# Patient Record
Sex: MALE | Race: AMERICAN INDIAN OR ALASKA NATIVE
[De-identification: names, ages, dates, MRNs, and addresses within clinical notes are randomized per-mention and may not be internally consistent; named-entity substitution may affect disease eponyms.]

---

## 2019-06-08 ENCOUNTER — HOSPITAL ENCOUNTER (EMERGENCY)
Dept: HOSPITAL 56 - MW.ED | Age: 39
Discharge: HOME | End: 2019-06-08
Payer: SELF-PAY

## 2019-06-08 DIAGNOSIS — I50.9: Primary | ICD-10-CM

## 2019-06-08 DIAGNOSIS — F17.210: ICD-10-CM

## 2019-06-08 DIAGNOSIS — Z79.899: ICD-10-CM

## 2019-06-08 LAB
CHLORIDE SERPL-SCNC: 107 MMOL/L (ref 98–107)
SODIUM SERPL-SCNC: 139 MMOL/L (ref 136–148)

## 2019-06-08 PROCEDURE — 71045 X-RAY EXAM CHEST 1 VIEW: CPT

## 2019-06-08 PROCEDURE — 83690 ASSAY OF LIPASE: CPT

## 2019-06-08 PROCEDURE — 85025 COMPLETE CBC W/AUTO DIFF WBC: CPT

## 2019-06-08 PROCEDURE — 99285 EMERGENCY DEPT VISIT HI MDM: CPT

## 2019-06-08 PROCEDURE — 85610 PROTHROMBIN TIME: CPT

## 2019-06-08 PROCEDURE — 93005 ELECTROCARDIOGRAM TRACING: CPT

## 2019-06-08 PROCEDURE — 83880 ASSAY OF NATRIURETIC PEPTIDE: CPT

## 2019-06-08 PROCEDURE — 80053 COMPREHEN METABOLIC PANEL: CPT

## 2019-06-08 PROCEDURE — 84484 ASSAY OF TROPONIN QUANT: CPT

## 2019-06-08 NOTE — CR
INDICATION:



Chest pain



TECHNIQUE:



Chest 1 view



COMPARISON:



None



FINDINGS:



Cardiovascular and mediastinum:  Mild cardiomegaly with normal pulmonary 

vasculature. 



Lungs and pleural spaces:  Lungs are clear.  No sign of infiltrate or mass. 

 No sign of pleural effusion.  No pneumothorax.  



Bones and soft tissues:  No significant findings.



IMPRESSION:



Mild cardiomegaly without acute pulmonary consolidation.



Dictated by Jonh Spangler MD @ Jun 8 2019  6:50AM



Signed by Dr. Jonh Spangler @ Jun 8 2019  6:51AM

## 2019-06-08 NOTE — EDM.PDOC
<Clarita Collins - Last Filed: 06/08/19 07:04>





ED HPI GENERAL MEDICAL PROBLEM





- General


Chief Complaint: Respiratory Problem


Stated Complaint: SHORTNESS OF BREATH


Time Seen by Provider: 06/08/19 06:18





- History of Present Illness


INITIAL COMMENTS - FREE TEXT/NARRATIVE: 





HISTORY AND PHYSICAL:





History of present illness:


The patient is a 39-year-old male who presents with several weeks of an on 

again off again cough sometimes spastic and sometimes productive of phlegm and 

new shortness of breath over the last 1 week. He says that he does not have 

chest pain specifically but sometimes he feels like the cough and the shortness 

of breath or choking him and there is a pressure-like sensation with the 

shortness of breath. He's not had fevers chills and only recently has had some 

epigastric discomfort without vomiting or diarrhea. The patient does not have a 

local provider but says that in 2016 he had fluid in his lungs and was treated 

with 2 different antihypertensives and to diuretics and saw Dr. Summers in [place] 

as well as Dr. Jara the nephrologist. He says that at this time he had 

kidney trouble and had a follow with the nephrologist and he did follow-up with 

Dr. Summers and he started having more hypotension and he was taken off of his 

blood pressure medications as well as his diuretics and he says he has not been 

very compliant with follow-ups since that time. When I asked him if he had an 

echocardiogram he says yes but he does not know the results and he is not sure 

why he had the heart failure and then subsequently was taken off all 

medications. On this ED visit he says he has not had any leg pain or swelling 

he has been eating and drinking normally and he has not had any fevers or 

chills. The patient says he does sleep on 2 pillows but sometimes has trouble 

and has to sit upright to sleep. The patient says that when he is at work and 

doing activities he feels more short of breath and this is new over the last 1 

week as well. He came in this morning versus any other day because he felt like 

his symptoms would worsen at work today.





Review of systems: 


As per history of present illness and below otherwise all systems reviewed and 

negative.





Past medical history: 


As per history of present illness and as reviewed below otherwise 

noncontributory.





Surgical history: 


As per history of present illness and as reviewed below otherwise 

noncontributory.





Social history: 


No reported history of drug or alcohol abuse.





Family history: 


As per history of present illness and as reviewed below otherwise 

noncontributory.





Physical exam:


General: Well-developed well-nourished man who is nontoxic and not breathless 

on my evaluation but seems a little anxious. He is tachycardic and other vitals 

are noted by me as well


HEENT: Atraumatic, normocephalic, pupils reactive, negative for conjunctival 

pallor or scleral icterus, mucous membranes moist, throat clear, neck supple, 

nontender, trachea midline.


Lungs: Clear to auscultation diminished breath sounds in the bases more on the 

left side but no stridor or wheezing and no abdominal work of breathing, breath 

sounds equal bilaterally, chest nontender.


Heart: S1S2, regular rhythm and tachycardic rate of my evaluation but no overt 

murmurs. There is no evidence of JVD on my evaluation and when I laid the 

patient in the supine position he was not dyspneic


Abdomen: Soft, nondistended, nontender. Negative for masses or 

hepatosplenomegaly. Negative for costovertebral tenderness.


Pelvis: Stable nontender.


Genitourinary: Deferred.


Rectal: Deferred.


Extremities: Atraumatic, negative for cords or calf pain. Neurovascular 

unremarkable. No pedal edema or leg asymmetry


Neuro: Awake, alert, oriented. Cranial nerves II through XII unremarkable. 

Cerebellum unremarkable. Motor and sensory unremarkable throughout. Exam 

nonfocal.





Diagnostics:


EKG CBC CMP INR BNP troponin lipase UA chest x-ray





Therapeutics:


IV O2 monitor aspirin Nitropaste





7a: Case was endorsed to Dr. Duckworth to follow-up testing results and 

disposition patient. Patient is currently stable here in the ED.





Impression: 


Dyspnea acute on chronic





Definitive disposition and diagnosis as appropriate pending reevaluation and 

review of above.


  ** epigastric


Pain Score (Numeric/FACES): 9





- Related Data


 Allergies











Allergy/AdvReac Type Severity Reaction Status Date / Time


 


No Known Allergies Allergy   Verified 06/08/19 06:16











Home Meds: 


 Home Meds





Furosemide [Lasix] 20 mg PO BID #20 tab 06/08/19 [Rx]


Potassium Chloride [Klor-Con 10] 10 meq PO DAILY #20 tab.er 06/08/19 [Rx]











Social & Family History





- Tobacco Use


Smoking Status *Q: Current Every Day Smoker


Years of Tobacco use: 19


Packs/Tins Daily: 1





- Recreational Drug Use


Recreational Drug Use: Yes


Drug Use in Last 12 Months: Yes


Recreational Drug Type: Reports: Marijuana/Hashish


Recreational Drug Use Frequency: Socially





ED ROS GENERAL





- Review of Systems


Review Of Systems: ROS reveals no pertinent complaints other than HPI.





ED EXAM, GENERAL





- Physical Exam


Exam: See Below (See dictation)





Course





- Vital Signs


Last Recorded V/S: 


 Last Vital Signs











Temp  96.7 F   06/08/19 06:07


 


Pulse  103 H  06/08/19 07:06


 


Resp  20   06/08/19 07:06


 


BP  110/81   06/08/19 07:06


 


Pulse Ox  100   06/08/19 07:06














- Orders/Labs/Meds


Orders: 


 Active Orders 24 hr











 Category Date Time Status


 


 Cardiac Monitoring [RC] .AS DIRECTED Care  06/08/19 06:09 Active


 


 EKG Documentation Completion [RC] STAT Care  06/08/19 06:09 Active


 


 Oxygen Therapy, ED [RC] ASDIRECTED Care  06/08/19 06:09 Active


 


 Pulse Oximetry [RC] ASDIRECTED Care  06/08/19 06:09 Active


 


 UA RFX QUINN AND CULT IF INDIC [URIN] Stat Lab  06/08/19 06:23 Ordered


 


 Sodium Chloride 0.9% [Saline Flush] Med  06/08/19 06:23 Active





 10 ml FLUSH ASDIRECTED PRN   


 


 Sodium Chloride 0.9% [Saline Flush] Med  06/08/19 06:23 Active





 2.5 ml FLUSH ASDIRECTED PRN   


 


 Saline Lock Insert [OM.PC] Stat Oth  06/08/19 06:22 Ordered








 Medication Orders





Sodium Chloride (Saline Flush)  10 ml FLUSH ASDIRECTED PRN


   PRN Reason: Keep Vein Open


Sodium Chloride (Saline Flush)  2.5 ml FLUSH ASDIRECTED PRN


   PRN Reason: Keep Vein Open








Labs: 


 Laboratory Tests











  06/08/19 06/08/19 06/08/19 Range/Units





  06:20 06:20 06:20 


 


WBC  11.28 H    (4.0-11.0)  K/uL


 


RBC  4.53    (4.50-5.90)  M/uL


 


Hgb  13.5    (13.0-17.0)  g/dL


 


Hct  41.1    (38.0-50.0)  %


 


MCV  90.7    (80.0-98.0)  fL


 


MCH  29.8    (27.0-32.0)  pg


 


MCHC  32.8    (31.0-37.0)  g/dL


 


RDW Std Deviation  44.9    (28.0-62.0)  fl


 


RDW Coeff of Esteban  14    (11.0-15.0)  %


 


Plt Count  321    (150-400)  K/uL


 


MPV  9.20    (7.40-12.00)  fL


 


Neut % (Auto)  56.4    (48.0-80.0)  %


 


Lymph % (Auto)  34.5    (16.0-40.0)  %


 


Mono % (Auto)  8.2    (0.0-15.0)  %


 


Eos % (Auto)  0.5    (0.0-7.0)  %


 


Baso % (Auto)  0.4    (0.0-1.5)  %


 


Neut # (Auto)  6.4 H    (1.4-5.7)  K/uL


 


Lymph # (Auto)  3.9 H    (0.6-2.4)  K/uL


 


Mono # (Auto)  0.9 H    (0.0-0.8)  K/uL


 


Eos # (Auto)  0.1    (0.0-0.7)  K/uL


 


Baso # (Auto)  0.0    (0.0-0.1)  K/uL


 


Nucleated RBC %  0.0    /100WBC


 


Nucleated RBCs #  0    K/uL


 


INR   1.17   


 


Sodium    139  (136-148)  mmol/L


 


Potassium    4.6  (3.5-5.1)  mmol/L


 


Chloride    107  ()  mmol/L


 


Carbon Dioxide    22.2  (21.0-32.0)  mmol/L


 


BUN    21 H  (7.0-18.0)  mg/dL


 


Creatinine    1.2  (0.8-1.3)  mg/dL


 


Est Cr Clr Drug Dosing    85.34  mL/min


 


Estimated GFR (MDRD)    > 60.0  ml/min


 


Glucose    125 H  ()  mg/dL


 


Calcium    8.3 L  (8.5-10.1)  mg/dL


 


Total Bilirubin    0.9  (0.2-1.0)  mg/dL


 


AST    35  (15-37)  IU/L


 


ALT    50  (14-63)  IU/L


 


Alkaline Phosphatase    110  ()  U/L


 


Troponin I    < 0.050  (0.000-0.056)  ng/mL


 


B-Natriuretic Peptide     (<100)  PG/ML


 


Total Protein    6.9  (6.4-8.2)  g/dL


 


Albumin    3.5  (3.4-5.0)  g/dL


 


Globulin    3.4  (2.6-4.0)  g/dL


 


Albumin/Globulin Ratio    1.0  (0.9-1.6)  


 


Lipase    85  ()  U/L














  06/08/19 Range/Units





  06:20 


 


WBC   (4.0-11.0)  K/uL


 


RBC   (4.50-5.90)  M/uL


 


Hgb   (13.0-17.0)  g/dL


 


Hct   (38.0-50.0)  %


 


MCV   (80.0-98.0)  fL


 


MCH   (27.0-32.0)  pg


 


MCHC   (31.0-37.0)  g/dL


 


RDW Std Deviation   (28.0-62.0)  fl


 


RDW Coeff of Esteban   (11.0-15.0)  %


 


Plt Count   (150-400)  K/uL


 


MPV   (7.40-12.00)  fL


 


Neut % (Auto)   (48.0-80.0)  %


 


Lymph % (Auto)   (16.0-40.0)  %


 


Mono % (Auto)   (0.0-15.0)  %


 


Eos % (Auto)   (0.0-7.0)  %


 


Baso % (Auto)   (0.0-1.5)  %


 


Neut # (Auto)   (1.4-5.7)  K/uL


 


Lymph # (Auto)   (0.6-2.4)  K/uL


 


Mono # (Auto)   (0.0-0.8)  K/uL


 


Eos # (Auto)   (0.0-0.7)  K/uL


 


Baso # (Auto)   (0.0-0.1)  K/uL


 


Nucleated RBC %   /100WBC


 


Nucleated RBCs #   K/uL


 


INR   


 


Sodium   (136-148)  mmol/L


 


Potassium   (3.5-5.1)  mmol/L


 


Chloride   ()  mmol/L


 


Carbon Dioxide   (21.0-32.0)  mmol/L


 


BUN   (7.0-18.0)  mg/dL


 


Creatinine   (0.8-1.3)  mg/dL


 


Est Cr Clr Drug Dosing   mL/min


 


Estimated GFR (MDRD)   ml/min


 


Glucose   ()  mg/dL


 


Calcium   (8.5-10.1)  mg/dL


 


Total Bilirubin   (0.2-1.0)  mg/dL


 


AST   (15-37)  IU/L


 


ALT   (14-63)  IU/L


 


Alkaline Phosphatase   ()  U/L


 


Troponin I   (0.000-0.056)  ng/mL


 


B-Natriuretic Peptide  879 H  (<100)  PG/ML


 


Total Protein   (6.4-8.2)  g/dL


 


Albumin   (3.4-5.0)  g/dL


 


Globulin   (2.6-4.0)  g/dL


 


Albumin/Globulin Ratio   (0.9-1.6)  


 


Lipase   ()  U/L











Meds: 


Medications











Generic Name Dose Route Start Last Admin





  Trade Name Freq  PRN Reason Stop Dose Admin


 


Sodium Chloride  10 ml  06/08/19 06:23  





  Saline Flush  FLUSH   





  ASDIRECTED PRN   





  Keep Vein Open   





     





     





     


 


Sodium Chloride  2.5 ml  06/08/19 06:23  





  Saline Flush  FLUSH   





  ASDIRECTED PRN   





  Keep Vein Open   





     





     





     














Discontinued Medications














Generic Name Dose Route Start Last Admin





  Trade Name Freq  PRN Reason Stop Dose Admin


 


Aspirin  324 mg  06/08/19 06:22  06/08/19 06:44





  Aspirin  PO  06/08/19 06:23  324 mg





  ONETIME ONE   Administration





     





     





     





     


 


Nitroglycerin  0.5 gm  06/08/19 06:22  06/08/19 06:47





  Nitro-Bid 2%  TOP  06/08/19 06:23  0.5 gm





  ONETIME ONE   Administration





     





     





     





     














Departure





- Departure


Disposition: Home, Self-Care 01


Clinical Impression: 


CHF (congestive heart failure)


Qualifiers:


 Heart failure type: other Qualified Code(s): I50.9 - Heart failure, unspecified








- Discharge Information


Prescriptions: 


Furosemide [Lasix] 20 mg PO BID #20 tab


Potassium Chloride [Klor-Con 10] 10 meq PO DAILY #20 tab.er


Forms:  ED Department Discharge


Additional Instructions: 


The following information is given to patients seen in the emergency department 

who are being discharged to home. This information is to outline your options 

for follow-up care. We provide all patients seen in our emergency department 

with a follow-up referral.





The need for follow-up, as well as the timing and circumstances, are variable 

depending upon the specifics of your emergency department visit.





If you don't have a primary care physician on staff, we will provide you with a 

referral. We always advise you to contact your personal physician following an 

emergency department visit to inform them of the circumstance of the visit and 

for follow-up with them and/or the need for any referrals to a consulting 

specialist.





The emergency department will also refer you to a specialist when appropriate. 

This referral assures that you have the opportunity for follow-up care with a 

specialist. All of these measure are taken in an effort to provide you with 

optimal care, which includes your follow-up.





Under all circumstances we always encourage you to contact your private 

physician who remains a resource for coordinating your care. When calling for 

follow-up care, please make the office aware that this follow-up is from your 

recent emergency room visit. If for any reason you are refused follow-up, 

please contact the [facility] Emergency 

Department at [phone] and asked to speak to the emergency department 

charge nurse.





Take meds as directed, follow up with your primary care physician, return to ER 

if symptoms worsen or change.





[facility]


Primary Care


[address] [place] [address]


Phone: [phone]


Fax: [phone]





<Barb Duckworth - Last Filed: 06/08/19 07:43>





Departure





- Departure


Time of Disposition: 07:42


Condition: Good





- Discharge Information


*PRESCRIPTION DRUG MONITORING PROGRAM REVIEWED*: No


*COPY OF PRESCRIPTION DRUG MONITORING REPORT IN PATIENT DOMENICO: No

## 2019-06-18 ENCOUNTER — HOSPITAL ENCOUNTER (OUTPATIENT)
Dept: HOSPITAL 56 - MW.ED | Age: 39
Setting detail: OBSERVATION
LOS: 2 days | Discharge: HOME | End: 2019-06-20
Attending: INTERNAL MEDICINE | Admitting: INTERNAL MEDICINE
Payer: COMMERCIAL

## 2019-06-18 DIAGNOSIS — I50.9: Primary | ICD-10-CM

## 2019-06-18 DIAGNOSIS — Z87.891: ICD-10-CM

## 2019-06-18 DIAGNOSIS — Z82.49: ICD-10-CM

## 2019-06-18 DIAGNOSIS — Z79.899: ICD-10-CM

## 2019-06-18 LAB
CHLORIDE SERPL-SCNC: 104 MMOL/L (ref 98–107)
SODIUM SERPL-SCNC: 137 MMOL/L (ref 136–148)

## 2019-06-18 PROCEDURE — G0378 HOSPITAL OBSERVATION PER HR: HCPCS

## 2019-06-18 RX ADMIN — SODIUM CHLORIDE, PRESERVATIVE FREE PRN ML: 5 INJECTION INTRAVENOUS at 21:22

## 2019-06-18 NOTE — CR
INDICATION:



Shortness of breath/CHF.



TECHNIQUE:



Single-view chest.



COMPARISON:



06/08/2019.



FINDINGS:



Heart size appears prominent and stable. Lungs are free of infiltrate. No 

findings to suggest pulmonary edema.



IMPRESSION:



No significant infiltrate or pulmonary edema is seen.



Dictated by Alvino Cantu MD @ 6/18/2019 9:44:49 PM



Dictated by: Alvino Cantu MD @ 06/18/2019 21:45:09



(Electronically Signed)

## 2019-06-18 NOTE — EDM.PDOC
<Dominga Jensen R - Last Filed: 06/18/19 22:01>





ED HPI GENERAL MEDICAL PROBLEM





- General


Chief Complaint: Cardiovascular Problem


Stated Complaint: TROUBLE BREATHING


Time Seen by Provider: 06/18/19 21:05


Source of Information: Reports: Patient


History Limitations: Reports: No Limitations





- History of Present Illness


INITIAL COMMENTS - FREE TEXT/NARRATIVE: 





Reporting that he has noticed increased shortness of breath particularly with 

activity and almost choking sensation in his throat and sweating. He has a 

history of CHF. He was seen here a couple weeks ago was given a dose of Lasix 

and sent home--he has not followed up.   He also noted that he has not been 

"Peeing much".  Denies any fever, chest pain, abdominal pain, itchy skin or 

swelling in his lower legs ankles or feet.  He reports he must sleep either on 

his right side or sitting up.  He states that he checks his blood pressure 

regularly at home and it has been running in the 120s systolic and between 80 

and the mid 90s diastolic.  He has family history of CHF. He also was a regular 

methamphetamine user for many years. He states that he quit a couple of years 

ago.  He has seen Dr. Summers in cardiology and doctor Parra in nephrology in 

the past. He states he had been on some different medications but then was 

taken off of all of his medications. 


  ** no pain


Pain Score (Numeric/FACES): 0





- Related Data


 Allergies











Allergy/AdvReac Type Severity Reaction Status Date / Time


 


No Known Allergies Allergy   Verified 06/18/19 21:00











Home Meds: 


 Home Meds





Furosemide [Lasix] 20 mg PO BID #20 tab 06/08/19 [Rx]


Potassium Chloride [Klor-Con 10] 10 meq PO DAILY #20 tab.er 06/08/19 [Rx]











Past Medical History


HEENT History: Reports: None


Cardiovascular History: Reports: Heart Failure


Respiratory History: Reports: None


Gastrointestinal History: Reports: None


Genitourinary History: Reports: None


Musculoskeletal History: Reports: None


Neurological History: Reports: None


Psychiatric History: Reports: None


Endocrine/Metabolic History: Reports: None


Hematologic History: Reports: None


Oncologic (Cancer) History: Reports: None


Dermatologic History: Reports: None





- Infectious Disease History


Infectious Disease History: Reports: Chicken Pox





Social & Family History





- Family History


Family Medical History: Noncontributory





- Tobacco Use


Smoking Status *Q: Former Smoker


Used Tobacco, but Quit: Yes


Month/Year Tobacco Last Used: 2018





- Caffeine Use


Caffeine Use: Reports: Energy Drinks, Soda





- Recreational Drug Use


Recreational Drug Use: Yes


Recreational Drug Type: Reports: Marijuana/Hashish





ED ROS GENERAL





- Review of Systems


Review Of Systems: ROS reveals no pertinent complaints other than HPI.





ED EXAM, GENERAL





- Physical Exam


Exam: See Below


Exam Limited By: No Limitations


General Appearance: Alert, Mild Distress (Due to shortness of breath)


Ears: Normal External Exam


Nose: Normal Inspection


Throat/Mouth: Normal Inspection


Head: Atraumatic, Normocephalic


Neck: Normal Inspection


Respiratory/Chest: Lungs Clear, Normal Breath Sounds, Other (Tachypnea, mild 

increased work of breathing)


Cardiovascular: Normal Peripheral Pulses, Regular Rate, Rhythm, No Edema, No 

Murmur


GI/Abdominal: Soft, No Distention


Back Exam: Normal Inspection


Extremities: Normal Inspection


Neurological: Alert, Oriented, No Motor/Sensory Deficits


Psychiatric: Normal Affect, Normal Mood


Skin Exam: Warm, Dry, Intact, Normal Color, No Rash


Lymphatic: No Adenopathy





Course





- Vital Signs


Last Recorded V/S: 





 Last Vital Signs











Temp  35.6 C   06/18/19 21:00


 


Pulse  109 H  06/18/19 22:59


 


Resp  20   06/18/19 22:59


 


BP  117/59 L  06/18/19 22:59


 


Pulse Ox  97   06/18/19 22:59














- Orders/Labs/Meds


Orders: 





 Active Orders 24 hr











 Category Date Time Status


 


 Patient Status [ADT] Stat ADT  06/18/19 23:08 Active


 


 Cardiac Monitoring [RC] .AS DIRECTED Care  06/18/19 21:07 Active


 


 EKG Documentation Completion [RC] STAT Care  06/18/19 21:07 Active


 


 Oxygen Therapy, ED [RC] ASDIRECTED Care  06/18/19 21:07 Active


 


 Sodium Chloride 0.9% [Saline Flush] Med  06/18/19 21:07 Active





 10 ml FLUSH ASDIRECTED PRN   


 


 Sodium Chloride 0.9% [Saline Flush] Med  06/18/19 21:07 Active





 2.5 ml FLUSH ASDIRECTED PRN   


 


 Saline Lock Insert [OM.PC] Stat Oth  06/18/19 21:07 Ordered








 Medication Orders





Sodium Chloride (Saline Flush)  10 ml FLUSH ASDIRECTED PRN


   PRN Reason: Keep Vein Open


   Last Admin: 06/18/19 21:22  Dose: 10 ml


Sodium Chloride (Saline Flush)  2.5 ml FLUSH ASDIRECTED PRN


   PRN Reason: Keep Vein Open


   Last Admin: 06/18/19 21:22  Dose: 2.5 ml








Labs: 





 Laboratory Tests











  06/18/19 06/18/19 06/18/19 Range/Units





  21:08 21:08 21:08 


 


WBC  10.74    (4.0-11.0)  K/uL


 


RBC  4.76    (4.50-5.90)  M/uL


 


Hgb  14.1    (13.0-17.0)  g/dL


 


Hct  43.5    (38.0-50.0)  %


 


MCV  91.4    (80.0-98.0)  fL


 


MCH  29.6    (27.0-32.0)  pg


 


MCHC  32.4    (31.0-37.0)  g/dL


 


RDW Std Deviation  48.2    (28.0-62.0)  fl


 


RDW Coeff of Esteban  15    (11.0-15.0)  %


 


Plt Count  377    (150-400)  K/uL


 


MPV  9.30    (7.40-12.00)  fL


 


Neut % (Auto)  60.4    (48.0-80.0)  %


 


Lymph % (Auto)  31.7    (16.0-40.0)  %


 


Mono % (Auto)  6.2    (0.0-15.0)  %


 


Eos % (Auto)  1.0    (0.0-7.0)  %


 


Baso % (Auto)  0.7    (0.0-1.5)  %


 


Neut # (Auto)  6.5 H    (1.4-5.7)  K/uL


 


Lymph # (Auto)  3.4 H    (0.6-2.4)  K/uL


 


Mono # (Auto)  0.7    (0.0-0.8)  K/uL


 


Eos # (Auto)  0.1    (0.0-0.7)  K/uL


 


Baso # (Auto)  0.1    (0.0-0.1)  K/uL


 


Nucleated RBC %  0.0    /100WBC


 


Nucleated RBCs #  0    K/uL


 


INR   1.10   


 


Sodium    137  (136-148)  mmol/L


 


Potassium    4.4  (3.5-5.1)  mmol/L


 


Chloride    104  ()  mmol/L


 


Carbon Dioxide    24.3  (21.0-32.0)  mmol/L


 


BUN    22 H  (7.0-18.0)  mg/dL


 


Creatinine    1.2  (0.8-1.3)  mg/dL


 


Est Cr Clr Drug Dosing    85.34  mL/min


 


Estimated GFR (MDRD)    > 60.0  ml/min


 


Glucose    94  ()  mg/dL


 


Calcium    8.4 L  (8.5-10.1)  mg/dL


 


Total Bilirubin    1.0  (0.2-1.0)  mg/dL


 


AST    27  (15-37)  IU/L


 


ALT    39  (14-63)  IU/L


 


Alkaline Phosphatase    111  ()  U/L


 


Troponin I    < 0.050  (0.000-0.056)  ng/mL


 


B-Natriuretic Peptide     (<100)  PG/ML


 


Total Protein    6.8  (6.4-8.2)  g/dL


 


Albumin    3.3 L  (3.4-5.0)  g/dL


 


Globulin    3.5  (2.6-4.0)  g/dL


 


Albumin/Globulin Ratio    0.9  (0.9-1.6)  














  06/18/19 Range/Units





  21:08 


 


WBC   (4.0-11.0)  K/uL


 


RBC   (4.50-5.90)  M/uL


 


Hgb   (13.0-17.0)  g/dL


 


Hct   (38.0-50.0)  %


 


MCV   (80.0-98.0)  fL


 


MCH   (27.0-32.0)  pg


 


MCHC   (31.0-37.0)  g/dL


 


RDW Std Deviation   (28.0-62.0)  fl


 


RDW Coeff of Esteban   (11.0-15.0)  %


 


Plt Count   (150-400)  K/uL


 


MPV   (7.40-12.00)  fL


 


Neut % (Auto)   (48.0-80.0)  %


 


Lymph % (Auto)   (16.0-40.0)  %


 


Mono % (Auto)   (0.0-15.0)  %


 


Eos % (Auto)   (0.0-7.0)  %


 


Baso % (Auto)   (0.0-1.5)  %


 


Neut # (Auto)   (1.4-5.7)  K/uL


 


Lymph # (Auto)   (0.6-2.4)  K/uL


 


Mono # (Auto)   (0.0-0.8)  K/uL


 


Eos # (Auto)   (0.0-0.7)  K/uL


 


Baso # (Auto)   (0.0-0.1)  K/uL


 


Nucleated RBC %   /100WBC


 


Nucleated RBCs #   K/uL


 


INR   


 


Sodium   (136-148)  mmol/L


 


Potassium   (3.5-5.1)  mmol/L


 


Chloride   ()  mmol/L


 


Carbon Dioxide   (21.0-32.0)  mmol/L


 


BUN   (7.0-18.0)  mg/dL


 


Creatinine   (0.8-1.3)  mg/dL


 


Est Cr Clr Drug Dosing   mL/min


 


Estimated GFR (MDRD)   ml/min


 


Glucose   ()  mg/dL


 


Calcium   (8.5-10.1)  mg/dL


 


Total Bilirubin   (0.2-1.0)  mg/dL


 


AST   (15-37)  IU/L


 


ALT   (14-63)  IU/L


 


Alkaline Phosphatase   ()  U/L


 


Troponin I   (0.000-0.056)  ng/mL


 


B-Natriuretic Peptide  889 H  (<100)  PG/ML


 


Total Protein   (6.4-8.2)  g/dL


 


Albumin   (3.4-5.0)  g/dL


 


Globulin   (2.6-4.0)  g/dL


 


Albumin/Globulin Ratio   (0.9-1.6)  











Meds: 





Medications











Generic Name Dose Route Start Last Admin





  Trade Name Freq  PRN Reason Stop Dose Admin


 


Sodium Chloride  10 ml  06/18/19 21:07  06/18/19 21:22





  Saline Flush  FLUSH   10 ml





  ASDIRECTED PRN   Administration





  Keep Vein Open   





     





     





     


 


Sodium Chloride  2.5 ml  06/18/19 21:07  06/18/19 21:22





  Saline Flush  FLUSH   2.5 ml





  ASDIRECTED PRN   Administration





  Keep Vein Open   





     





     





     














Discontinued Medications














Generic Name Dose Route Start Last Admin





  Trade Name Freq  PRN Reason Stop Dose Admin


 


Furosemide  20 mg  06/18/19 23:06  





  Lasix  IVPUSH  06/18/19 23:07  





  NOW ONE   





     





     





     





     














Departure





- Departure


Disposition: Refer to Observation


Clinical Impression: 


CHF (congestive heart failure)


Qualifiers:


 Heart failure type: other Qualified Code(s): I50.9 - Heart failure, unspecified





Forms:  ED Department Discharge





- My Orders


Last 24 Hours: 





My Active Orders





06/18/19 21:07


Cardiac Monitoring [RC] .AS DIRECTED 


EKG Documentation Completion [RC] STAT 


Oxygen Therapy, ED [RC] ASDIRECTED 


Sodium Chloride 0.9% [Saline Flush]   10 ml FLUSH ASDIRECTED PRN 


Sodium Chloride 0.9% [Saline Flush]   2.5 ml FLUSH ASDIRECTED PRN 


Saline Lock Insert [OM.PC] Stat 





06/18/19 23:08


Patient Status [ADT] Stat 














- Assessment/Plan


Last 24 Hours: 





My Active Orders





06/18/19 21:07


Cardiac Monitoring [RC] .AS DIRECTED 


EKG Documentation Completion [RC] STAT 


Oxygen Therapy, ED [RC] ASDIRECTED 


Sodium Chloride 0.9% [Saline Flush]   10 ml FLUSH ASDIRECTED PRN 


Sodium Chloride 0.9% [Saline Flush]   2.5 ml FLUSH ASDIRECTED PRN 


Saline Lock Insert [OM.PC] Stat 





06/18/19 23:08


Patient Status [ADT] Stat 














<Sergio Williamson - Last Filed: 06/18/19 23:11>





Course





- Vital Signs


Text/Narrative:: 





Emergency department course is been unremarkable patient was given Lasix 20 mg 

IV I discussed case with hospitalist who graciously accepted the patient is 

admission for observation impression #1 congestive heart failure #2 medical 

noncompliance 





Departure





- Departure


Time of Disposition: 23:10


Condition: Good

## 2019-06-19 LAB
CHLORIDE SERPL-SCNC: 102 MMOL/L (ref 98–107)
HBA1C BLD-MCNC: 5.8 % (ref 4.5–6.2)
SODIUM SERPL-SCNC: 136 MMOL/L (ref 136–148)

## 2019-06-19 RX ADMIN — POTASSIUM CHLORIDE SCH MEQ: 750 TABLET, FILM COATED, EXTENDED RELEASE ORAL at 09:16

## 2019-06-19 NOTE — PCM.HP
<Craig Mckay - Last Filed: 06/19/19 10:10>





H&P History of Present Illness





- General


Date of Service: 06/19/19


Admit Problem/Dx: 


 Admission Diagnosis/Problem





Admission Diagnosis/Problem      Congestive heart failure











- History of Present Illness


Initial Comments - Free Text/Narative: 





40 y/o male with history of HF and previous methamphetamine use. Had been 

seeing Dr. Summers, Cardiology, however, states that last time he saw him he was 

instructed to stop his medications. Patient states that for the past 1 week he 

has been feeling more short of breath and coughing. Worse when laying down. He 

uses two pillows at night. Has not been taking any medications. Denies chest 

pain, radiation to left arm or neck. Feeling more weak than usual. No nausea, 

vomiting. No abdominal pain, dysuria, diarrhea, blood in stool. Denies 

worsening lower extremity swelling. 


In the ER, he was found to be tachycardic, tachypneic with O2 sat of low 90's 

on RA. He was diuresed with lasix overnight. He still feels short of breath but 

improved. Troponin was negative. BNP of 800. Pending Echo results.








  ** no pain


Pain Score (Numeric/FACES): 0





- Related Data


Allergies/Adverse Reactions: 


 Allergies











Allergy/AdvReac Type Severity Reaction Status Date / Time


 


No Known Allergies Allergy   Verified 06/19/19 09:23











Home Medications: 


 Home Meds





Furosemide [Lasix] 20 mg PO BID #20 tab 06/08/19 [Rx]


Potassium Chloride [Klor-Con 10] 10 meq PO DAILY #20 tab.er 06/08/19 [Rx]











Past Medical History


HEENT History: Reports: None


Cardiovascular History: Reports: Heart Failure


Respiratory History: Reports: None


Gastrointestinal History: Reports: None


Genitourinary History: Reports: None


Musculoskeletal History: Reports: None


Neurological History: Reports: None


Psychiatric History: Reports: None


Endocrine/Metabolic History: Reports: None


Hematologic History: Reports: None


Oncologic (Cancer) History: Reports: None


Dermatologic History: Reports: None





- Infectious Disease History


Infectious Disease History: Reports: Chicken Pox





- Past Surgical History


Cardiovascular Surgical History: Reports: None





Social & Family History





- Family History


Family Medical History: Noncontributory





- Tobacco Use


Smoking Status *Q: Former Smoker


Used Tobacco, but Quit: Yes


Month/Year Tobacco Last Used: 2 months ago


Second Hand Smoke Exposure: Yes





- Caffeine Use


Caffeine Use: Reports: Energy Drinks





- Recreational Drug Use


Recreational Drug Use: Yes


Drug Use in Last 12 Months: Yes


Recreational Drug Type: Reports: Marijuana/Hashish


Recreational Drug Use Frequency: Weekly





H&P Review of Systems





- Review of Systems:


Review Of Systems: ROS reveals no pertinent complaints other than HPI.





Exam





- Exam


Exam: See Below





- Vital Signs


Vital Signs: 


 Last Vital Signs











Temp  36.6 C   06/19/19 04:19


 


Pulse  90   06/19/19 04:19


 


Resp  18   06/19/19 04:19


 


BP  105/64   06/19/19 04:19


 


Pulse Ox  93 L  06/19/19 04:19











Weight: 103.464 kg





- Exam


General: Alert, Oriented, Cooperative


HEENT: Conjunctiva Clear, Mucosa Moist & Pink


Lungs: Clear to Auscultation, Normal Respiratory Effort.  No: Crackles, Wheezing


Cardiovascular: Regular Rate, Regular Rhythm


GI/Abdominal Exam: Normal Bowel Sounds, Soft, Non-Tender


Extremities: Normal Inspection, No Pedal Edema


Skin: Warm, Dry





- Patient Data


Lab Results Last 24 hrs: 


 Laboratory Results - last 24 hr











  06/18/19 06/18/19 06/18/19 Range/Units





  21:08 21:08 21:08 


 


WBC  10.74    (4.0-11.0)  K/uL


 


RBC  4.76    (4.50-5.90)  M/uL


 


Hgb  14.1    (13.0-17.0)  g/dL


 


Hct  43.5    (38.0-50.0)  %


 


MCV  91.4    (80.0-98.0)  fL


 


MCH  29.6    (27.0-32.0)  pg


 


MCHC  32.4    (31.0-37.0)  g/dL


 


RDW Std Deviation  48.2    (28.0-62.0)  fl


 


RDW Coeff of Esteban  15    (11.0-15.0)  %


 


Plt Count  377    (150-400)  K/uL


 


MPV  9.30    (7.40-12.00)  fL


 


Neut % (Auto)  60.4    (48.0-80.0)  %


 


Lymph % (Auto)  31.7    (16.0-40.0)  %


 


Mono % (Auto)  6.2    (0.0-15.0)  %


 


Eos % (Auto)  1.0    (0.0-7.0)  %


 


Baso % (Auto)  0.7    (0.0-1.5)  %


 


Neut # (Auto)  6.5 H    (1.4-5.7)  K/uL


 


Lymph # (Auto)  3.4 H    (0.6-2.4)  K/uL


 


Mono # (Auto)  0.7    (0.0-0.8)  K/uL


 


Eos # (Auto)  0.1    (0.0-0.7)  K/uL


 


Baso # (Auto)  0.1    (0.0-0.1)  K/uL


 


Nucleated RBC %  0.0    /100WBC


 


Nucleated RBCs #  0    K/uL


 


INR   1.10   


 


Sodium    137  (136-148)  mmol/L


 


Potassium    4.4  (3.5-5.1)  mmol/L


 


Chloride    104  ()  mmol/L


 


Carbon Dioxide    24.3  (21.0-32.0)  mmol/L


 


BUN    22 H  (7.0-18.0)  mg/dL


 


Creatinine    1.2  (0.8-1.3)  mg/dL


 


Est Cr Clr Drug Dosing    85.34  mL/min


 


Estimated GFR (MDRD)    > 60.0  ml/min


 


Glucose    94  ()  mg/dL


 


Calcium    8.4 L  (8.5-10.1)  mg/dL


 


Total Bilirubin    1.0  (0.2-1.0)  mg/dL


 


AST    27  (15-37)  IU/L


 


ALT    39  (14-63)  IU/L


 


Alkaline Phosphatase    111  ()  U/L


 


Troponin I    < 0.050  (0.000-0.056)  ng/mL


 


B-Natriuretic Peptide     (<100)  PG/ML


 


Total Protein    6.8  (6.4-8.2)  g/dL


 


Albumin    3.3 L  (3.4-5.0)  g/dL


 


Globulin    3.5  (2.6-4.0)  g/dL


 


Albumin/Globulin Ratio    0.9  (0.9-1.6)  














  06/18/19 06/19/19 06/19/19 Range/Units





  21:08 04:48 04:48 


 


WBC   9.22   (4.0-11.0)  K/uL


 


RBC   4.61   (4.50-5.90)  M/uL


 


Hgb   13.6   (13.0-17.0)  g/dL


 


Hct   41.5   (38.0-50.0)  %


 


MCV   90.0   (80.0-98.0)  fL


 


MCH   29.5   (27.0-32.0)  pg


 


MCHC   32.8   (31.0-37.0)  g/dL


 


RDW Std Deviation   47.3   (28.0-62.0)  fl


 


RDW Coeff of Esteban   15   (11.0-15.0)  %


 


Plt Count   368   (150-400)  K/uL


 


MPV   9.50   (7.40-12.00)  fL


 


Neut % (Auto)   57.2   (48.0-80.0)  %


 


Lymph % (Auto)   35.2   (16.0-40.0)  %


 


Mono % (Auto)   6.0   (0.0-15.0)  %


 


Eos % (Auto)   0.8   (0.0-7.0)  %


 


Baso % (Auto)   0.8   (0.0-1.5)  %


 


Neut # (Auto)   5.3   (1.4-5.7)  K/uL


 


Lymph # (Auto)   3.3 H   (0.6-2.4)  K/uL


 


Mono # (Auto)   0.6   (0.0-0.8)  K/uL


 


Eos # (Auto)   0.1   (0.0-0.7)  K/uL


 


Baso # (Auto)   0.1   (0.0-0.1)  K/uL


 


Nucleated RBC %   0.0   /100WBC


 


Nucleated RBCs #   0   K/uL


 


INR     


 


Sodium    136  (136-148)  mmol/L


 


Potassium    3.8  (3.5-5.1)  mmol/L


 


Chloride    102  ()  mmol/L


 


Carbon Dioxide    21.5  (21.0-32.0)  mmol/L


 


BUN    23 H  (7.0-18.0)  mg/dL


 


Creatinine    1.1  (0.8-1.3)  mg/dL


 


Est Cr Clr Drug Dosing    93.09  mL/min


 


Estimated GFR (MDRD)    > 60.0  ml/min


 


Glucose    99  ()  mg/dL


 


Calcium    8.4 L  (8.5-10.1)  mg/dL


 


Total Bilirubin     (0.2-1.0)  mg/dL


 


AST     (15-37)  IU/L


 


ALT     (14-63)  IU/L


 


Alkaline Phosphatase     ()  U/L


 


Troponin I     (0.000-0.056)  ng/mL


 


B-Natriuretic Peptide  889 H    (<100)  PG/ML


 


Total Protein     (6.4-8.2)  g/dL


 


Albumin     (3.4-5.0)  g/dL


 


Globulin     (2.6-4.0)  g/dL


 


Albumin/Globulin Ratio     (0.9-1.6)  











Result Diagrams: 


 06/19/19 04:48





 06/19/19 04:48


Problem List Initiated/Reviewed/Updated: Yes


Orders Last 24hrs: 


 Active Orders 24 hr











 Category Date Time Status


 


 Patient Status [ADT] Stat ADT  06/18/19 23:08 Active


 


 Cardiac Monitoring [RC] CONTINUOUS Care  06/19/19 07:01 Active


 


 Daily Weight [Height and Weight] [RC] DAILY Care  06/19/19 08:16 Active


 


 Intake and Output Strict [RC] ASDIRECTED Care  06/19/19 08:16 Active


 


 Oxygen Therapy [RC] PRN Care  06/19/19 07:00 Active


 


 Telemetry Monitoring [Cardiac Monitoring] [RC] Q8H Care  06/19/19 00:17 Active


 


 Up ad Alejandra [RC] ASDIRECTED Care  06/19/19 07:00 Active


 


 VTE/DVT Education [RC] PER UNIT ROUTINE Care  06/19/19 07:00 Active


 


 Vital Signs [RC] Q4H Care  06/19/19 07:00 Active


 


 Fluid Restriction [DIET] Diet  06/19/19 Breakfast Active


 


 Regular Diet [DIET] Diet  06/19/19 Breakfast Active


 


 Echo Comp wo Cont [US] Routine Exams  06/19/19 07:02 Taken


 


 CBC WITH AUTO DIFF [HEME] AM Lab  06/20/19 05:11 Ordered


 


 COMPREHENSIVE METABOLIC PN,CMP [CHEM] AM Lab  06/20/19 05:11 Ordered


 


 DRUG SCREEN, URINE [URCHEM] Routine Lab  06/19/19 08:21 Ordered


 


 Acetaminophen [Tylenol] Med  06/19/19 00:14 Active





 650 mg PO Q6H PRN   


 


 Enoxaparin [Lovenox] Med  06/19/19 07:00 Active





 30 mg SUBCUT Q24H   


 


 Furosemide [Lasix] Med  06/19/19 08:30 Active





 20 mg IVPUSH Q6H   


 


 Potassium Chloride [Klor-Con 10] Med  06/19/19 09:00 Active





 10 meq PO DAILY   


 


 Sodium Chloride 0.9% [Saline Flush] Med  06/18/19 21:07 Active





 10 ml FLUSH ASDIRECTED PRN   


 


 Sodium Chloride 0.9% [Saline Flush] Med  06/19/19 00:16 Active





 10 ml FLUSH ASDIRECTED PRN   


 


 Sodium Chloride 0.9% [Saline Flush] Med  06/18/19 21:07 Active





 2.5 ml FLUSH ASDIRECTED PRN   


 


 Sodium Chloride 0.9% [Saline Flush] Med  06/19/19 00:16 Active





 2.5 ml FLUSH ASDIRECTED PRN   


 


 oxyCODONE Med  06/19/19 00:15 Active





 5 mg PO Q4H PRN   


 


 Saline Lock Insert [OM.PC] Routine Oth  06/19/19 00:16 Ordered


 


 Saline Lock Insert [OM.PC] Stat Oth  06/18/19 21:07 Ordered


 


 Resuscitation Status Routine Resus Stat  06/19/19 07:00 Ordered








 Medication Orders





Acetaminophen (Tylenol)  650 mg PO Q6H PRN


   PRN Reason: Pain


Enoxaparin Sodium (Lovenox)  30 mg SUBCUT Q24H [facility]


   Last Admin: 06/19/19 08:14  Dose: 30 mg


Furosemide (Lasix)  20 mg IVPUSH Q6H [facility]


   Stop: 06/20/19 02:31


   Last Admin: 06/19/19 09:18  Dose: 20 mg


Oxycodone HCl (Oxycodone)  5 mg PO Q4H PRN


   PRN Reason: Pain


Potassium Chloride (Klor-Con 10)  10 meq PO DAILY [facility]


   Last Admin: 06/19/19 09:16  Dose: 10 meq


Sodium Chloride (Saline Flush)  10 ml FLUSH ASDIRECTED PRN


   PRN Reason: Keep Vein Open


   Last Admin: 06/18/19 21:22  Dose: 10 ml


Sodium Chloride (Saline Flush)  2.5 ml FLUSH ASDIRECTED PRN


   PRN Reason: Keep Vein Open


   Last Admin: 06/18/19 21:22  Dose: 2.5 ml


Sodium Chloride (Saline Flush)  10 ml FLUSH ASDIRECTED PRN


   PRN Reason: Keep Vein Open


Sodium Chloride (Saline Flush)  2.5 ml FLUSH ASDIRECTED PRN


   PRN Reason: Keep Vein Open








Assessment/Plan Comment:: 





A:


1. Acute CHF exacerbation


2. Hx of polysubstance abuse





P:


1. Acute CHF exacerbation. Pending Echo results. Fluid restriction, daily 

weights, strict I/O's.  Will start lasix 20 mg IV q12H.


2. Hx of polysubstance abuse- will get urine drug screen.





Dispo: 1-2 days.





<Guille Rivera - Last Filed: 06/19/19 10:19>





H&P History of Present Illness





- General


Admit Problem/Dx: 


 Admission Diagnosis/Problem





Admission Diagnosis/Problem      Congestive heart failure





I have seen and examined to patient independently of medical resident, Craig Rainey MD. I have discussed the case for care of this patient with him. I 

have reviewed and approve of the plan of care as outlined by medical resident. 

Please see orders. 





Exam





- Vital Signs


Vital Signs: 


 Last Vital Signs











Temp  36.6 C   06/19/19 04:19


 


Pulse  90   06/19/19 04:19


 


Resp  18   06/19/19 04:19


 


BP  105/64   06/19/19 04:19


 


Pulse Ox  93 L  06/19/19 04:19














- Patient Data


Lab Results Last 24 hrs: 


 Laboratory Results - last 24 hr











  06/18/19 06/18/19 06/18/19 Range/Units





  21:08 21:08 21:08 


 


WBC  10.74    (4.0-11.0)  K/uL


 


RBC  4.76    (4.50-5.90)  M/uL


 


Hgb  14.1    (13.0-17.0)  g/dL


 


Hct  43.5    (38.0-50.0)  %


 


MCV  91.4    (80.0-98.0)  fL


 


MCH  29.6    (27.0-32.0)  pg


 


MCHC  32.4    (31.0-37.0)  g/dL


 


RDW Std Deviation  48.2    (28.0-62.0)  fl


 


RDW Coeff of Esteban  15    (11.0-15.0)  %


 


Plt Count  377    (150-400)  K/uL


 


MPV  9.30    (7.40-12.00)  fL


 


Neut % (Auto)  60.4    (48.0-80.0)  %


 


Lymph % (Auto)  31.7    (16.0-40.0)  %


 


Mono % (Auto)  6.2    (0.0-15.0)  %


 


Eos % (Auto)  1.0    (0.0-7.0)  %


 


Baso % (Auto)  0.7    (0.0-1.5)  %


 


Neut # (Auto)  6.5 H    (1.4-5.7)  K/uL


 


Lymph # (Auto)  3.4 H    (0.6-2.4)  K/uL


 


Mono # (Auto)  0.7    (0.0-0.8)  K/uL


 


Eos # (Auto)  0.1    (0.0-0.7)  K/uL


 


Baso # (Auto)  0.1    (0.0-0.1)  K/uL


 


Nucleated RBC %  0.0    /100WBC


 


Nucleated RBCs #  0    K/uL


 


INR   1.10   


 


Sodium    137  (136-148)  mmol/L


 


Potassium    4.4  (3.5-5.1)  mmol/L


 


Chloride    104  ()  mmol/L


 


Carbon Dioxide    24.3  (21.0-32.0)  mmol/L


 


BUN    22 H  (7.0-18.0)  mg/dL


 


Creatinine    1.2  (0.8-1.3)  mg/dL


 


Est Cr Clr Drug Dosing    85.34  mL/min


 


Estimated GFR (MDRD)    > 60.0  ml/min


 


Glucose    94  ()  mg/dL


 


Calcium    8.4 L  (8.5-10.1)  mg/dL


 


Total Bilirubin    1.0  (0.2-1.0)  mg/dL


 


AST    27  (15-37)  IU/L


 


ALT    39  (14-63)  IU/L


 


Alkaline Phosphatase    111  ()  U/L


 


Troponin I    < 0.050  (0.000-0.056)  ng/mL


 


B-Natriuretic Peptide     (<100)  PG/ML


 


Total Protein    6.8  (6.4-8.2)  g/dL


 


Albumin    3.3 L  (3.4-5.0)  g/dL


 


Globulin    3.5  (2.6-4.0)  g/dL


 


Albumin/Globulin Ratio    0.9  (0.9-1.6)  














  06/18/19 06/19/19 06/19/19 Range/Units





  21:08 04:48 04:48 


 


WBC   9.22   (4.0-11.0)  K/uL


 


RBC   4.61   (4.50-5.90)  M/uL


 


Hgb   13.6   (13.0-17.0)  g/dL


 


Hct   41.5   (38.0-50.0)  %


 


MCV   90.0   (80.0-98.0)  fL


 


MCH   29.5   (27.0-32.0)  pg


 


MCHC   32.8   (31.0-37.0)  g/dL


 


RDW Std Deviation   47.3   (28.0-62.0)  fl


 


RDW Coeff of Esteban   15   (11.0-15.0)  %


 


Plt Count   368   (150-400)  K/uL


 


MPV   9.50   (7.40-12.00)  fL


 


Neut % (Auto)   57.2   (48.0-80.0)  %


 


Lymph % (Auto)   35.2   (16.0-40.0)  %


 


Mono % (Auto)   6.0   (0.0-15.0)  %


 


Eos % (Auto)   0.8   (0.0-7.0)  %


 


Baso % (Auto)   0.8   (0.0-1.5)  %


 


Neut # (Auto)   5.3   (1.4-5.7)  K/uL


 


Lymph # (Auto)   3.3 H   (0.6-2.4)  K/uL


 


Mono # (Auto)   0.6   (0.0-0.8)  K/uL


 


Eos # (Auto)   0.1   (0.0-0.7)  K/uL


 


Baso # (Auto)   0.1   (0.0-0.1)  K/uL


 


Nucleated RBC %   0.0   /100WBC


 


Nucleated RBCs #   0   K/uL


 


INR     


 


Sodium    136  (136-148)  mmol/L


 


Potassium    3.8  (3.5-5.1)  mmol/L


 


Chloride    102  ()  mmol/L


 


Carbon Dioxide    21.5  (21.0-32.0)  mmol/L


 


BUN    23 H  (7.0-18.0)  mg/dL


 


Creatinine    1.1  (0.8-1.3)  mg/dL


 


Est Cr Clr Drug Dosing    93.09  mL/min


 


Estimated GFR (MDRD)    > 60.0  ml/min


 


Glucose    99  ()  mg/dL


 


Calcium    8.4 L  (8.5-10.1)  mg/dL


 


Total Bilirubin     (0.2-1.0)  mg/dL


 


AST     (15-37)  IU/L


 


ALT     (14-63)  IU/L


 


Alkaline Phosphatase     ()  U/L


 


Troponin I     (0.000-0.056)  ng/mL


 


B-Natriuretic Peptide  889 H    (<100)  PG/ML


 


Total Protein     (6.4-8.2)  g/dL


 


Albumin     (3.4-5.0)  g/dL


 


Globulin     (2.6-4.0)  g/dL


 


Albumin/Globulin Ratio     (0.9-1.6)  











Result Diagrams: 


 06/19/19 04:48





 06/19/19 04:48


Orders Last 24hrs: 


 Active Orders 24 hr











 Category Date Time Status


 


 Patient Status [ADT] Stat ADT  06/18/19 23:08 Active


 


 Cardiac Monitoring [RC] CONTINUOUS Care  06/19/19 07:01 Active


 


 Daily Weight [Height and Weight] [RC] DAILY Care  06/19/19 08:16 Active


 


 Intake and Output Strict [RC] ASDIRECTED Care  06/19/19 08:16 Active


 


 Oxygen Therapy [RC] PRN Care  06/19/19 07:00 Active


 


 Telemetry Monitoring [Cardiac Monitoring] [RC] Q8H Care  06/19/19 00:17 Active


 


 Up ad Alejandra [RC] ASDIRECTED Care  06/19/19 07:00 Active


 


 VTE/DVT Education [RC] PER UNIT ROUTINE Care  06/19/19 07:00 Active


 


 Vital Signs [RC] Q4H Care  06/19/19 07:00 Active


 


 Fluid Restriction [DIET] Diet  06/19/19 Breakfast Active


 


 Regular Diet [DIET] Diet  06/19/19 Breakfast Active


 


 Echo Comp wo Cont [US] Routine Exams  06/19/19 07:02 Taken


 


 CBC WITH AUTO DIFF [HEME] AM Lab  06/20/19 05:11 Ordered


 


 COMPREHENSIVE METABOLIC PN,CMP [CHEM] AM Lab  06/20/19 05:11 Ordered


 


 DRUG SCREEN, URINE [URCHEM] Routine Lab  06/19/19 10:00 Received


 


 Acetaminophen [Tylenol] Med  06/19/19 00:14 Active





 650 mg PO Q6H PRN   


 


 Enoxaparin [Lovenox] Med  06/19/19 07:00 Active





 30 mg SUBCUT Q24H   


 


 Furosemide [Lasix] Med  06/19/19 10:13 Once





 20 mg IVPUSH NOW ONE   


 


 Furosemide [Lasix] Med  06/19/19 18:00 Ordered





 20 mg IVPUSH Q12H   


 


 Potassium Chloride [Klor-Con 10] Med  06/19/19 09:00 Active





 10 meq PO DAILY   


 


 Sodium Chloride 0.9% [Saline Flush] Med  06/18/19 21:07 Active





 10 ml FLUSH ASDIRECTED PRN   


 


 Sodium Chloride 0.9% [Saline Flush] Med  06/19/19 00:16 Active





 10 ml FLUSH ASDIRECTED PRN   


 


 Sodium Chloride 0.9% [Saline Flush] Med  06/18/19 21:07 Active





 2.5 ml FLUSH ASDIRECTED PRN   


 


 Sodium Chloride 0.9% [Saline Flush] Med  06/19/19 00:16 Active





 2.5 ml FLUSH ASDIRECTED PRN   


 


 oxyCODONE Med  06/19/19 00:15 Active





 5 mg PO Q4H PRN   


 


 Saline Lock Insert [OM.PC] Routine Ot  06/19/19 00:16 Ordered


 


 Saline Lock Insert [OM.PC] Stat Ot  06/18/19 21:07 Ordered


 


 Resuscitation Status Routine Resus Stat  06/19/19 07:00 Ordered








 Medication Orders





Acetaminophen (Tylenol)  650 mg PO Q6H PRN


   PRN Reason: Pain


Enoxaparin Sodium (Lovenox)  30 mg SUBCUT Q24H [facility]


   Last Admin: 06/19/19 08:14  Dose: 30 mg


Furosemide (Lasix)  20 mg IVPUSH NOW ONE


   Stop: 06/19/19 10:14


Furosemide (Lasix)  20 mg IVPUSH Q12H [facility]


   Stop: 06/20/19 18:01


Oxycodone HCl (Oxycodone)  5 mg PO Q4H PRN


   PRN Reason: Pain


Potassium Chloride (Klor-Con 10)  10 meq PO DAILY [facility]


   Last Admin: 06/19/19 09:16  Dose: 10 meq


Sodium Chloride (Saline Flush)  10 ml FLUSH ASDIRECTED PRN


   PRN Reason: Keep Vein Open


   Last Admin: 06/18/19 21:22  Dose: 10 ml


Sodium Chloride (Saline Flush)  2.5 ml FLUSH ASDIRECTED PRN


   PRN Reason: Keep Vein Open


   Last Admin: 06/18/19 21:22  Dose: 2.5 ml


Sodium Chloride (Saline Flush)  10 ml FLUSH ASDIRECTED PRN


   PRN Reason: Keep Vein Open


Sodium Chloride (Saline Flush)  2.5 ml FLUSH ASDIRECTED PRN


   PRN Reason: Keep Vein Open

## 2019-06-20 LAB
CHLORIDE SERPL-SCNC: 102 MMOL/L (ref 98–107)
SODIUM SERPL-SCNC: 135 MMOL/L (ref 136–148)

## 2019-06-20 RX ADMIN — SODIUM CHLORIDE, PRESERVATIVE FREE PRN ML: 5 INJECTION INTRAVENOUS at 06:51

## 2019-06-20 RX ADMIN — POTASSIUM CHLORIDE SCH MEQ: 750 TABLET, FILM COATED, EXTENDED RELEASE ORAL at 09:07

## 2019-06-20 NOTE — PCM.DCSUM1
<Craig Mckay - Last Filed: 06/20/19 09:31>





**Discharge Summary





- Hospital Course


Free Text/Narrative:: 





38 y/o male with history of HF who states has not been taking his medications 

for over 1 year. Presented with worsening shortness of breath. Admitted for 

Acute CHF exacerbation. Initial BNP of 900. He was aggressively diuresed which 

helped his breathing and edema. ECHO showed EF 20-25% with severe decreased 

left ventricular systolic function and valvular disease. He was discharged home 

on Lasix 40 mg PO daily and potassium 20 mEq PO daily. He was instructed to 

follow-up with cardiology.





- Discharge Data


Discharge Date: 06/20/19


Discharge Disposition: Home, Self-Care 01


Condition: Good





- Patient Instructions


Diet: Heart Healthy Diet


Fluid Restriction: 2000 mL


Activity: As Tolerated


Notify Provider of: Fever, Increased Pain, Swelling and Redness, Nausea and/or 

Vomiting





- Discharge Plan


*PRESCRIPTION DRUG MONITORING PROGRAM REVIEWED*: Not Applicable


*COPY OF PRESCRIPTION DRUG MONITORING REPORT IN PATIENT DOMENICO: Not Applicable


Prescriptions/Med Rec: 


Furosemide [Lasix] 40 mg PO DAILY 30 Days #30 tab


Potassium Chloride 20 meq PO DAILY 30 Days #30 tablet.er


Home Medications: 


 Home Meds





Potassium Chloride [Klor-Con 10] 10 meq PO DAILY #20 tab.er 06/08/19 [Rx]


Furosemide [Lasix] 40 mg PO DAILY 30 Days #30 tab 06/20/19 [Rx]


Potassium Chloride 20 meq PO DAILY 30 Days #30 tablet.er 06/20/19 [Rx]








Patient Handouts:  Furosemide tablets, Potassium chloride tablets, extended-

release tablets or capsules, Heart Failure, Easy-to-Read, Form - Daily Weight 

Record


Referrals: 


Craig Mckay MD [Resident] - 06/25/19 3:00 pm


Chadd Monroy MD [Physician] - 07/02/19 9:30 am





- Discharge Summary/Plan Comment


DC Time >30 min.: No





- Patient Data


Vitals - Most Recent: 


 Last Vital Signs











Temp  35.9 C   06/20/19 08:00


 


Pulse  100   06/20/19 08:00


 


Resp  16   06/20/19 08:00


 


BP  117/88   06/20/19 08:00


 


Pulse Ox  95   06/20/19 08:00











Weight - Most Recent: 100.868 kg


I&O - Last 24 hours: 


 Intake & Output











 06/19/19 06/20/19 06/20/19





 22:59 06:59 14:59


 


Intake Total 950 400 


 


Output Total 1200 1900 


 


Balance -250 -1500 











Lab Results - Last 24 hrs: 


 Laboratory Results - last 24 hr











  06/19/19 06/19/19 06/19/19 Range/Units





  10:00 10:30 10:30 


 


Sodium     (136-148)  mmol/L


 


Potassium     (3.5-5.1)  mmol/L


 


Chloride     ()  mmol/L


 


Carbon Dioxide     (21.0-32.0)  mmol/L


 


BUN     (7.0-18.0)  mg/dL


 


Creatinine     (0.8-1.3)  mg/dL


 


Est Cr Clr Drug Dosing     mL/min


 


Estimated GFR (MDRD)     ml/min


 


Glucose     ()  mg/dL


 


Hemoglobin A1c    5.8  (4.5-6.2)  %


 


Calcium     (8.5-10.1)  mg/dL


 


B-Natriuretic Peptide     (<100)  PG/ML


 


Triglycerides   104   (0-200)  mg/dL


 


Cholesterol   154   ()  mg/dL


 


LDL Cholesterol, Calc   99   ()  mg/dL


 


VLDL Cholesterol   20   (5-55)  mg/dL


 


HDL Cholesterol   34 L   (40-60)  mg/dL


 


Cholesterol/HDL Ratio   4.5   (3.3-6.0)  


 


Urine Opiates Screen  NEGATIVE    (NEGATIVE)  


 


Ur Oxycodone Screen  NEGATIVE    (NEGATIVE)  


 


Urine Methadone Screen  NEGATIVE    (NEGATIVE)  


 


Ur Barbiturates Screen  NEGATIVE    (NEGATIVE)  


 


Ur Phencyclidine Scrn  NEGATIVE    (NEGATIVE)  


 


Ur Amphetamine Screen  NEGATIVE    (NEGATIVE)  


 


U Methamphetamines Scrn  NEGATIVE    (NEGATIVE)  


 


U Benzodiazepines Scrn  NEGATIVE    (NEGATIVE)  


 


U Cocaine Metab Screen  NEGATIVE    (NEGATIVE)  


 


U Marijuana (THC) Screen  POSITIVE    (NEGATIVE)  














  06/20/19 06/20/19 Range/Units





  05:40 05:40 


 


Sodium   135 L  (136-148)  mmol/L


 


Potassium   3.6  (3.5-5.1)  mmol/L


 


Chloride   102  ()  mmol/L


 


Carbon Dioxide   22.9  (21.0-32.0)  mmol/L


 


BUN   26 H  (7.0-18.0)  mg/dL


 


Creatinine   1.2  (0.8-1.3)  mg/dL


 


Est Cr Clr Drug Dosing   85.55  mL/min


 


Estimated GFR (MDRD)   > 60.0  ml/min


 


Glucose   107 H  ()  mg/dL


 


Hemoglobin A1c    (4.5-6.2)  %


 


Calcium   8.5  (8.5-10.1)  mg/dL


 


B-Natriuretic Peptide  615 H   (<100)  PG/ML


 


Triglycerides    (0-200)  mg/dL


 


Cholesterol    ()  mg/dL


 


LDL Cholesterol, Calc    ()  mg/dL


 


VLDL Cholesterol    (5-55)  mg/dL


 


HDL Cholesterol    (40-60)  mg/dL


 


Cholesterol/HDL Ratio    (3.3-6.0)  


 


Urine Opiates Screen    (NEGATIVE)  


 


Ur Oxycodone Screen    (NEGATIVE)  


 


Urine Methadone Screen    (NEGATIVE)  


 


Ur Barbiturates Screen    (NEGATIVE)  


 


Ur Phencyclidine Scrn    (NEGATIVE)  


 


Ur Amphetamine Screen    (NEGATIVE)  


 


U Methamphetamines Scrn    (NEGATIVE)  


 


U Benzodiazepines Scrn    (NEGATIVE)  


 


U Cocaine Metab Screen    (NEGATIVE)  


 


U Marijuana (THC) Screen    (NEGATIVE)  











Med Orders - Current: 


 Current Medications





Acetaminophen (Tylenol)  650 mg PO Q6H PRN


   PRN Reason: Pain


Enoxaparin Sodium (Lovenox)  30 mg SUBCUT Q24H [facility]


   Last Admin: 06/20/19 06:50 Dose:  30 mg


Furosemide (Lasix)  20 mg IVPUSH Q12H [facility]


   Stop: 06/20/19 18:01


   Last Admin: 06/20/19 06:50 Dose:  20 mg


Oxycodone HCl (Oxycodone)  5 mg PO Q4H PRN


   PRN Reason: Pain


Potassium Chloride (Klor-Con 10)  10 meq PO DAILY [facility]


   Last Admin: 06/20/19 09:07 Dose:  10 meq


Sodium Chloride (Saline Flush)  10 ml FLUSH ASDIRECTED PRN


   PRN Reason: Keep Vein Open


   Last Admin: 06/18/19 21:22 Dose:  10 ml


Sodium Chloride (Saline Flush)  2.5 ml FLUSH ASDIRECTED PRN


   PRN Reason: Keep Vein Open


   Last Admin: 06/20/19 06:51 Dose:  2.5 ml


Sodium Chloride (Saline Flush)  10 ml FLUSH ASDIRECTED PRN


   PRN Reason: Keep Vein Open


Sodium Chloride (Saline Flush)  2.5 ml FLUSH ASDIRECTED PRN


   PRN Reason: Keep Vein Open





Discontinued Medications





Furosemide (Lasix)  20 mg IVPUSH NOW ONE


   Stop: 06/18/19 23:07


   Last Admin: 06/18/19 23:13 Dose:  20 mg


Furosemide (Lasix)  20 mg IVPUSH DAILY [facility]


Furosemide (Lasix)  20 mg PO BIDDIURETIC [facility]


   Last Admin: 06/19/19 08:16 Dose:  20 mg


Furosemide (Lasix)  20 mg IVPUSH Q6H [facility]


   Stop: 06/20/19 02:31


   Last Admin: 06/19/19 09:18 Dose:  20 mg


Furosemide (Lasix)  20 mg IVPUSH NOW ONE


   Stop: 06/19/19 10:14


   Last Admin: 06/19/19 11:22 Dose:  Not Given











<NicoleGuille pate - Last Filed: 06/20/19 12:26>





**Discharge Summary





- Hospital Course


HPI Initial Comments: 





I have seen and examined to patient independently of medical resident, Craig Rainey MD. I have discussed the case for care of this patient with him. I 

have reviewed and approve of the plan of care as outlined by medical resident. 

Please see orders. 








- Patient Data


Vitals - Most Recent: 


 Last Vital Signs











Temp  35.9 C   06/20/19 08:00


 


Pulse  100   06/20/19 08:00


 


Resp  16   06/20/19 08:00


 


BP  117/88   06/20/19 08:00


 


Pulse Ox  95   06/20/19 08:00











I&O - Last 24 hours: 


 Intake & Output











 06/19/19 06/20/19 06/20/19





 22:59 06:59 14:59


 


Intake Total 950 400 


 


Output Total 1200 1900 


 


Balance -250 -1500 











Lab Results - Last 24 hrs: 


 Laboratory Results - last 24 hr











  06/20/19 06/20/19 Range/Units





  05:40 05:40 


 


Sodium   135 L  (136-148)  mmol/L


 


Potassium   3.6  (3.5-5.1)  mmol/L


 


Chloride   102  ()  mmol/L


 


Carbon Dioxide   22.9  (21.0-32.0)  mmol/L


 


BUN   26 H  (7.0-18.0)  mg/dL


 


Creatinine   1.2  (0.8-1.3)  mg/dL


 


Est Cr Clr Drug Dosing   85.55  mL/min


 


Estimated GFR (MDRD)   > 60.0  ml/min


 


Glucose   107 H  ()  mg/dL


 


Calcium   8.5  (8.5-10.1)  mg/dL


 


B-Natriuretic Peptide  615 H   (<100)  PG/ML











Med Orders - Current: 


 Current Medications








Discontinued Medications





Acetaminophen (Tylenol)  650 mg PO Q6H PRN


   PRN Reason: Pain


Enoxaparin Sodium (Lovenox)  30 mg SUBCUT Q24H [facility]


   Last Admin: 06/20/19 06:50 Dose:  30 mg


Furosemide (Lasix)  20 mg IVPUSH NOW ONE


   Stop: 06/18/19 23:07


   Last Admin: 06/18/19 23:13 Dose:  20 mg


Furosemide (Lasix)  20 mg IVPUSH DAILY [facility]


Furosemide (Lasix)  20 mg PO BIDDIURETIC [facility]DER


   Last Admin: 06/19/19 08:16 Dose:  20 mg


Furosemide (Lasix)  20 mg IVPUSH Q6H [facility]


   Stop: 06/20/19 02:31


   Last Admin: 06/19/19 09:18 Dose:  20 mg


Furosemide (Lasix)  20 mg IVPUSH NOW ONE


   Stop: 06/19/19 10:14


   Last Admin: 06/19/19 11:22 Dose:  Not Given


Furosemide (Lasix)  20 mg IVPUSH Q12H [facility]


   Stop: 06/20/19 18:01


   Last Admin: 06/20/19 06:50 Dose:  20 mg


Oxycodone HCl (Oxycodone)  5 mg PO Q4H PRN


   PRN Reason: Pain


Potassium Chloride (Klor-Con 10)  10 meq PO DAILY [facility]


   Last Admin: 06/20/19 09:07 Dose:  10 meq


Sodium Chloride (Saline Flush)  10 ml FLUSH ASDIRECTED PRN


   PRN Reason: Keep Vein Open


   Last Admin: 06/18/19 21:22 Dose:  10 ml


Sodium Chloride (Saline Flush)  2.5 ml FLUSH ASDIRECTED PRN


   PRN Reason: Keep Vein Open


   Last Admin: 06/20/19 06:51 Dose:  2.5 ml


Sodium Chloride (Saline Flush)  10 ml FLUSH ASDIRECTED PRN


   PRN Reason: Keep Vein Open


Sodium Chloride (Saline Flush)  2.5 ml FLUSH ASDIRECTED PRN


   PRN Reason: Keep Vein Open

## 2019-09-25 ENCOUNTER — HOSPITAL ENCOUNTER (EMERGENCY)
Dept: HOSPITAL 56 - MW.ED | Age: 39
Discharge: HOME | End: 2019-09-25
Payer: SELF-PAY

## 2019-09-25 DIAGNOSIS — L02.415: Primary | ICD-10-CM

## 2019-09-25 DIAGNOSIS — I50.9: ICD-10-CM

## 2019-09-25 DIAGNOSIS — F17.210: ICD-10-CM

## 2019-09-25 PROCEDURE — 99282 EMERGENCY DEPT VISIT SF MDM: CPT

## 2019-09-25 PROCEDURE — 10060 I&D ABSCESS SIMPLE/SINGLE: CPT

## 2019-09-25 NOTE — EDM.PDOC
ED HPI GENERAL MEDICAL PROBLEM





- General


Chief Complaint: Skin Complaint


Stated Complaint: RIGHT SWOLLEN LEG


Time Seen by Provider: 09/25/19 10:14


Source of Information: Reports: Patient


History Limitations: Reports: No Limitations





- History of Present Illness


INITIAL COMMENTS - FREE TEXT/NARRATIVE: 


HISTORY AND PHYSICAL:





History of present illness:


Patient is a 39-year-old male who presents to the emergency room today with 

complaints of an abscess to the right lateral and anterior right knee. He 

states a few days ago he noticed an area of irritation and it was slightly 

reddened any continued to "scratch at it ". Over the past few days he has 

noticed a white purulent center which he was getting minimal drainage from. 

States it's painful to palpation. No history of MRSA.





Review of systems: 


As per history of present illness and below otherwise all systems reviewed and 

negative.





Past medical history: 


As per history of present illness and as reviewed below otherwise 

noncontributory.





Surgical history: 


As per history of present illness and as reviewed below otherwise 

noncontributory.





Social history: 


See social history for further information





Family history: 


As per history of present illness and as reviewed below otherwise 

noncontributory.





Physical exam:


General:


HEENT: Atraumatic, normocephalic, pupils equal and reactive bilaterally, 

negative for conjunctival pallor or scleral icterus, mucous membranes moist, 

TMs normal bilaterally, throat clear, neck supple, nontender, trachea midline. 

No drooling or trismus noted. No meningeal signs. No hot potato voice noted. 


Lungs: Clear to auscultation, breath sounds equal bilaterally, chest nontender.


Heart: S1S2, regular rate and rhythm without overt murmur


Abdomen: Soft, nondistended, nontender. 


Skin: A dime-sized abscess is noted to the mid patella and lateral knee on the 

right. These are superficial with surrounding erythema. Otherwise skin is intact

, warm, dry. No lesions or rashes noted.


Extremities: Atraumatic, moves all extremities per self without difficulty or 

deficits. The abscess does not appear to affect the knee joint or bursa. 

Neurovascular unremarkable.


Neuro: Awake, alert, oriented. Cranial nerves II through XII unremarkable. 

Cerebellum unremarkable. Motor and sensory unremarkable throughout. Exam 

nonfocal.





Notes:


Iodine prep was used. 1% lidocaine was used to anesthetize the area. 

Superficial puncture wound was made with an 11 blade and able to express 

minimal amount of purulent drainage to both sites. Bacitracin nonstick dressing 

was applied. We discussed in great length the need to follow-up with his 

primary care provider or the orthopedic surgeon. Medication and supportive care 

measures were reviewed and discussed. Voices understanding and is agreeable to 

plan of care. Denies any further questions or concerns at this time.





Diagnostics:


None





Therapeutics:


Lidocaine, Bacitracin dressing





Prescription:


Bactrim DS


Tylenol #3 (#10)





Impression: 


Abscess





Plan:


1. Keep the skin clean and dry. Wash gently twice daily with soap and water. 


2. Apply gentle heat as we discussed. Take the antibiotic as directed.


3. Follow-up with your primary care provider or the general surgeon as we 

discussed. Return to the ED as needed and as discussed.





Definitive disposition and diagnosis as appropriate pending reevaluation and 

review of above.





  ** Right Knee


Pain Score (Numeric/FACES): 8





- Related Data


 Allergies











Allergy/AdvReac Type Severity Reaction Status Date / Time


 


No Known Allergies Allergy   Verified 09/25/19 10:25











Home Meds: 


 Home Meds





Acetaminophen with Codeine [Tylenol with Codeine #3 Tablet] 1 each PO Q4HR PRN #

10 tablet 09/25/19 [Rx]


Sulfamethoxazole/Trimethoprim [Bactrim Ds Tablet] 1 each PO BID 10 Days #20 

tablet 09/25/19 [Rx]











Past Medical History


HEENT History: Reports: None


Cardiovascular History: Reports: Heart Failure


Respiratory History: Reports: None


Gastrointestinal History: Reports: None


Genitourinary History: Reports: None


Musculoskeletal History: Reports: None


Neurological History: Reports: None


Psychiatric History: Reports: None


Endocrine/Metabolic History: Reports: None


Hematologic History: Reports: None


Oncologic (Cancer) History: Reports: None


Dermatologic History: Reports: None





- Infectious Disease History


Infectious Disease History: Reports: Chicken Pox





- Past Surgical History


Cardiovascular Surgical History: Reports: None





Social & Family History





- Family History


Family Medical History: Noncontributory





- Tobacco Use


Smoking Status *Q: Current Every Day Smoker


Years of Tobacco use: 25


Packs/Tins Daily: 0.5





- Caffeine Use


Caffeine Use: Reports: Energy Drinks





- Recreational Drug Use


Recreational Drug Use: No





ED ROS GENERAL





- Review of Systems


Review Of Systems: ROS reveals no pertinent complaints other than HPI.





ED EXAM, SKIN/RASH


Exam: See Below (See dictation)





Course





- Vital Signs


Last Recorded V/S: 


 Last Vital Signs











Temp  97.1 F   09/25/19 10:23


 


Pulse  89   09/25/19 10:23


 


Resp  18   09/25/19 10:23


 


BP  142/88 H  09/25/19 10:23


 


Pulse Ox  97   09/25/19 10:23














- Orders/Labs/Meds


Meds: 


Medications














Discontinued Medications














Generic Name Dose Route Start Last Admin





  Trade Name Freq  PRN Reason Stop Dose Admin


 


Bacitracin  1 dose  09/25/19 10:28  09/25/19 10:48





  Bacitracin Oint 1 Gm  TOP  09/25/19 10:29  1 dose





  ONETIME ONE   Administration





     





     





     





     


 


Lidocaine HCl  5 ml  09/25/19 10:27  09/25/19 10:48





  Xylocaine-Mpf 1%  INJECT  09/25/19 10:28  5 ml





  ONETIME ONE   Administration





     





     





     





     














Departure





- Departure


Time of Disposition: 10:39


Disposition: Home, Self-Care 01


Clinical Impression: 


 Abscess








- Discharge Information


Prescriptions: 


Acetaminophen with Codeine [Tylenol with Codeine #3 Tablet] 1 each PO Q4HR PRN #

10 tablet


 PRN Reason: Pain


Sulfamethoxazole/Trimethoprim [Bactrim Ds Tablet] 1 each PO BID 10 Days #20 

tablet


Instructions:  Skin Abscess


Referrals: 


PCP,Not In Area [Primary Care Provider] - 


Forms:  ED Department Discharge


Additional Instructions: 


The following information is given to patients seen in the emergency department 

who are being discharged to home. This information is to outline your options 

for follow-up care. We provide all patients seen in our emergency department 

with a follow-up referral.





The need for follow-up, as well as the timing and circumstances, are variable 

depending upon the specifics of your emergency department visit.





If you don't have a primary care physician on staff, we will provide you with a 

referral. We always advise you to contact your personal physician following an 

emergency department visit to inform them of the circumstance of the visit and 

for follow-up with them and/or the need for any referrals to a consulting 

specialist.





The emergency department will also refer you to a specialist when appropriate. 

This referral assures that you have the opportunity for follow-up care with a 

specialist. All of these measure are taken in an effort to provide you with 

optimal care, which includes your follow-up.





Under all circumstances we always encourage you to contact your private 

physician who remains a resource for coordinating your care. When calling for 

follow-up care, please make the office aware that this follow-up is from your 

recent emergency room visit. If for any reason you are refused follow-up, 

please contact the [facility] Emergency 

Department at [phone] and asked to speak to the emergency department 

charge nurse.





[facility]


Primary Care


[address] [place] [address]


Phone: [phone]


Fax: [phone]





[facility]


[address] [place] [address]


Phone: [phone]


Fax: [phone]





1. Keep the skin clean and dry. Wash gently twice daily with soap and water. 


2. Apply gentle heat as we discussed. Take the antibiotic as directed.


3. Follow-up with your primary care provider or the general surgeon as we 

discussed. Return to the ED as needed and as discussed.